# Patient Record
(demographics unavailable — no encounter records)

---

## 2025-02-27 NOTE — PHYSICAL EXAM
[Normal] : deep tendon reflexes were 2+ and symmetric [de-identified] : intermittently tearful  [de-identified] : TMs partially occluded by soft cerumen in canals bilaterally; oropharynx with asymmetry of the soft palate (s/p throat surgery) [de-identified] : +mild facial acne

## 2025-02-27 NOTE — ASSESSMENT
[FreeTextEntry1] : CHARLOTTE is a 17 y/o female presenting for initial evaluation of malnutrition in the setting of restrictive eating and exercise.  I believe CHARLOTTE has OSFED (other specified feeding or eating disorder), specifically atypical anorexia nervosa, as she demonstrates restriction of energy/food intake relative to requirements, fear of weight gain, and dysmorphic body image but is not at a significantly low body weight at this time.  She endorses strong eating disorder thoughts and has not received any eating disorder specific treatment despite struggling with her eating for the past 4-5 years.  Her current weight is 138.6 lbs and her treatment goal weight is at least 155-165 lbs based on review of her growth chart and previous weight curve.  She is medically stable at this time to begin outpatient treatment.    Plan - Psychoeducation on eating disorders and malnutrition provided to patient and parents today.  Discussed the physical effects and medical complications of malnutrition, as well as the adverse consequences of malnutrition on mental health, including worsening of anxiety and depression.  Discussed levels of care for eating disorder treatment, including outpatient care, [facility], PHP/day programs, residential programs, and inpatient treatment.  At this time, will plan to begin outpatient treatment, however pt may need HLOC if she is unable to make progress at home. - Nutrition recommendations provided by YOSHI today Tana today - please see separate note for details. - Discussed multidisciplinary team approach to treatment of eating disorders, including a medical provider, RD, and therapist +/- psychiatrist for medication management.   - Laboratory evaluation performed today (see below). - Will contact Ms. Roche, pt's guidance counselor at school, for collaboration of care. - RTC in 2 weeks for follow up with myself and RD.  All questions answered.  Support provided.

## 2025-02-27 NOTE — REVIEW OF SYSTEMS
[Normal] : Allergy /Immunologic [FreeTextEntry2] : +weight loss, feels tired, sleeps a lot  [FreeTextEntry3] : +wears glasses for myopia  [FreeTextEntry4] : no trouble swallowing, no other symptoms  [FreeTextEntry7] : +headaches and dizziness with weight loss  [FreeTextEntry8] : +occasional constipation with weight loss; no prior history of constipation  [de-identified] : +hair loss with weight loss [de-identified] : +cold intolerance with weight loss  [de-identified] : see HPI

## 2025-02-27 NOTE — REASON FOR VISIT
[Initial Evaluation] : an initial evaluation [Patient] : patient [Parents] : parents [Language Line ] : provided by Language Line   [Time Spent: ____ minutes] : Total time spent using  services: [unfilled] minutes. The patient's primary language is not English thus required  services. [FreeTextEntry1] : malnutrition/weight loss/eating disorder [Interpreters_IDNumber] : [id number] [Interpreters_FullName] : Rabia [TWNoteComboBox1] : Montenegrin

## 2025-02-27 NOTE — SOCIAL HISTORY
[Sexually active ever] : not sexually active [de-identified] : Lives in [place] with parents and younger sister (13 y/o), pet bird ("Teodoroiu").  Feels safe at home.  Gets along well with family. [FreeTextEntry9] : Table tennis club sport in school.  [de-identified] : 10th grader at [employer] in [place].  Grades are good.  Has friends in school.  No current bullying/safety concerns.  Wants to be an , , or  in the future.  Wants to go to college in [place].   [de-identified] : Denies. [de-identified] : Denies. [de-identified] : +Mood swings in the past few weeks.  Denies SI/SIB.  Stressed about school, her weight - speaking to her guidance counselor.  Possible trauma history at age 9 - see SW note - pt does not wish to discuss today, but has discussed with her guidance counselor. [de-identified] : Interested in boys.  Has a boyfriend.  No sexual activity.  Has been with boyfriend for over a year - parents aware.

## 2025-02-27 NOTE — HISTORY OF PRESENT ILLNESS
[Fear of Gaining Weight] : has fear of gaining weight [Preoccupation with Food, Shape and Weight] : preoccupation with food, shape and weight [Distorted  Body Image] : distorted body image [Purging ___] : no purging [Binging ___] : no binging [Diet Pills] : no diet pills [Emetics] : no emetics [Laxatives] : no laxatives [Diuretics] : no diuretics [Supplements] : no supplements [de-identified] : Tammie is a 17 y/o female presenting for evaluation of weight loss with concern for the presence of an eating disorder, referred by her school ([employer]).  Per parents, "we've noticed she doesn't want to eat because she is afraid of gaining weight".  Parents noticed a change in Javan about a year ago, but pt reports things began much earlier, when she started to overexercise during the COVID-19 pandemic in 2021.  Pt reports she was teased/bullied about her weight in middle school and began to exercise in the summer between 7th and 8th grade to try to lose weight/change her body appearance.  She began jogging on the treadmill for 2 hours 4 days per week; no other exercise at that time.  Also began to change her eating - tried to eat less, 1-2x/day.  Began counting calories and trying to stay under 1700 kcal per day.  Denies cutting out any specific foods from her diet.  Began weighing herself at home once every few weeks.  Majority of weight loss occurred between September 2022 - August 2023, when pt lost from approximately 150 lbs to 136 lbs according to her growth chart.  She has been maintaining at her current weight since September 2024 (beginning of the school year).  Parents report some days Javan eats normally and some days too little and feels guilty after eating foods she deems unhealthy.    Pt reports overeating/snacking when stressed and feels guilty afterwards but denies purging.  No binging or other ED behaviors.  Has been seeing her guidance counselor at school every 1-2 weeks for the past 3 months for body image concerns/dissatisfaction with her appearance.  Has never seen a therapist or nutritionist before.  PMD was never concerned about pt's weight loss, per parents. [de-identified] : approximately 150 lbs [de-identified] : September 2022 [de-identified] : 130 lbs [de-identified] : August 2024 [de-identified] : 138.6 lbs - no longer weighs herself at home [de-identified] : 120 lbs [de-identified] : B: nothing on weekdays; only eats breakfast on weekends (coffee, bread, scrambled eggs, cream cheese) L: nothing usually D: eats dinner sometimes but not yesterday (usually eats what mom cooks) S (9 pm): bowl of cereal (Cheerios) with whole milk  did not eat anything else yesterday - wasn't hungry and went on a school trip to [place] - normally tries to eat 2x/day (usually lunch and dinner) [de-identified] : see HPI - no current exercise  [de-identified] : age 12  [de-identified] : currently  [de-identified] : +regular menses

## 2025-03-12 NOTE — REASON FOR VISIT
[Follow-Up: _____] : a [unfilled] follow-up visit  [Patient] : patient [Parents] : parents [Language Line ] : provided by Language Line   [Time Spent: ____ minutes] : Total time spent using  services: [unfilled] minutes. The patient's primary language is not English thus required  services. [Interpreters_IDNumber] : [id number] [TWNoteComboBox1] : Danish

## 2025-03-12 NOTE — HISTORY OF PRESENT ILLNESS
[de-identified] : 15 y/o female presenting for malnutrition follow up in the setting of restrictive eating and exercise, diagnosed with atypical anorexia nervosa.  Seen for initial visit 2 weeks ago on 2/27.  Weight today is stable from visit 2 weeks ago at 138.6 lbs, though when asked, pt reports she thought she gained a lot of weight.  Per mom, pt has been eating "normally" since our last visit, 3x/day, and a bit more than before.  Not restricting as much.  Denies overeating/binging. For breakfast before school has been having smoothies made with fruit and low-fat cow's milk or cream cheese on whole wheat bread.  Has more food for breakfast on the weekends, per dad. For lunch - some days eats school cafeteria food (e.g. - chicken nuggets, pizza, etc.), or will eat at home after school around 3 pm - mom's home cooking.  Reports eating dinner consistently - home cooked food. Reports parents help her eat. Parents notice pt has more energy and she feels more confident about eating now, per mom. Not currently in therapy - was seeing guidance counselor in school (Ms. Roche) previously.  No acute physical complaints today. Initial labs notable for borderline low iron levels - has not started Slow Fe as pills are too big to swallow and would prefer a liquid iron formulation. [de-identified] : approximately 150 lbs [de-identified] : September 2022 [de-identified] : 130 lbs [de-identified] : August 2024 [de-identified] : 138.6 lbs [de-identified] : see RD note  [de-identified] : 2 weeks ago

## 2025-03-12 NOTE — ASSESSMENT
[FreeTextEntry1] : 17 y/o female presenting for malnutrition follow up in the setting of restrictive eating and exercise, diagnosed with atypical anorexia nervosa.  Seen for initial visit 2 weeks ago on 2/27.  Weight today is stable from visit 2 weeks ago at 138.6 lbs, though when asked, pt reports she thought she gained a lot of weight.  ED thoughts challenged.  Treatment goal weight is at least 155-165 lbs based on review of her growth chart and previous weight curve.  Has been eating better at home, per parents.  No acute concerns today.  Needs therapy referrals.  Plan - Appreciate RD recommendations (pt and parents meeting with YOSHI Fajardo today) for nutrition. - Provided with therapy resources in [place] today. - Iron deficiency: will provide liquid Fe as pt cannot swallow iron pills. - F/u in 3 weeks.

## 2025-03-12 NOTE — REVIEW OF SYSTEMS
[Normal] : ENT [FreeTextEntry3] : +wears glasses for myopia  [FreeTextEntry7] : +headaches and dizziness are stable [FreeTextEntry8] : constipation has improved, notes occasional mid-line lower abdominal pain but no dysuria or urinary symptoms; no gas/bloating [de-identified] : +hair loss with weight loss [de-identified] : +cold intolerance with weight loss  [de-identified] : see HPI

## 2025-03-12 NOTE — PHYSICAL EXAM
[Normal] : normal skin color and pigmentation, no significant rash and no skin lesions  [de-identified] : WWP, no LE edema [de-identified] : no suprapubic TTP, no CVAT [de-identified] : no gross deficits

## 2025-05-09 NOTE — HISTORY OF PRESENT ILLNESS
[de-identified] : 15 y/o female presenting for malnutrition follow up in the setting of restrictive eating and exercise c/w atypical anorexia nervosa.  Last seen 2 months ago on March 12.  Weight today is 134.6 lbs, down 4 lbs from last visit 2 months ago.  Pt happy about the weight loss.  Parents report pt is "eating better but not a lot".  Pt reports eating 2-3 meals per day.  Has consistent lunch and dinner meals but skips breakfast most days of the week as she is rushing to get to school.  Does eat breakfast on the weekends with family - coffee, eggs, pancakes, bread, etc - normal portions, per parents. For lunch and dinner, typically eats home cooked food prepared by mom.  Gets home and eats lunch around 4 pm after school and is not hungry for dinner with parents at 7-7:30 pm so sometimes just has cereal with milk, yogurt, or ice cream instead.  Has a lunch period in school at 11:10 am and does eat lunch sometimes, depending on if she likes the cafeteria food served that day - usually eats the school lunch 2x/week.     Started therapy with a new therapist - Blaire - in [place] 1 month ago.  Seeing her every 2 weeks in person - has had 2 sessions so far.  Feels comfortable with her.  Sessions are 1 hour long.  Pt also placed on wait list for [facility] [place] [facility] therapy. [de-identified] : 150 lbs [de-identified] : September 2022 [de-identified] : 130 lbs [de-identified] : August 2024 [de-identified] : 134.6 lbs [de-identified] : denies  [de-identified] : end of April [de-identified] : menses occurring monthly

## 2025-05-09 NOTE — REASON FOR VISIT
[Follow-Up: _____] : a [unfilled] follow-up visit  [Patient] : patient [Parents] : parents [Language Line ] : provided by Language Line   [Time Spent: ____ minutes] : Total time spent using  services: [unfilled] minutes. The patient's primary language is not English thus required  services. [Interpreters_IDNumber] : [id number] [TWNoteComboBox1] : Maldivian

## 2025-05-09 NOTE — PHYSICAL EXAM
[Normal] : abdomen normal bowel sounds, soft, non-tender, non distended and no hepatosplenomegaly [de-identified] : oropharynx with whitish exudates; baseline asymmetry of soft palate (s/p surgery); no petechiae [de-identified] : no cervical LAD [de-identified] : WWP, no LE edema [de-identified] : no gross deficits

## 2025-05-09 NOTE — REVIEW OF SYSTEMS
[Normal] : Allergy /Immunologic [de-identified] : see HPI [FreeTextEntry3] : +wears glasses for myopia  [FreeTextEntry4] : +sore throat x several days, subjective fever, +rhinorrhea/nasal congestion - saw PMD, prescribed amoxicillin x 10 days for presumed strep infection [FreeTextEntry7] : headaches and dizziness are resolved, pt is hydrating throughout the day  [FreeTextEntry8] : constipation has resolved, abdominal pain previously reported has improved  [de-identified] : +hair loss with weight loss [de-identified] : +cold intolerance unchanged [de-identified] : +borderline low iron levels, only took iron supplement for a few days as she didn't like the taste of the liquid

## 2025-05-09 NOTE — HISTORY OF PRESENT ILLNESS
[de-identified] : 15 y/o female presenting for malnutrition follow up in the setting of restrictive eating and exercise c/w atypical anorexia nervosa.  Last seen 2 months ago on March 12.  Weight today is 134.6 lbs, down 4 lbs from last visit 2 months ago.  Pt happy about the weight loss.  Parents report pt is "eating better but not a lot".  Pt reports eating 2-3 meals per day.  Has consistent lunch and dinner meals but skips breakfast most days of the week as she is rushing to get to school.  Does eat breakfast on the weekends with family - coffee, eggs, pancakes, bread, etc - normal portions, per parents. For lunch and dinner, typically eats home cooked food prepared by mom.  Gets home and eats lunch around 4 pm after school and is not hungry for dinner with parents at 7-7:30 pm so sometimes just has cereal with milk, yogurt, or ice cream instead.  Has a lunch period in school at 11:10 am and does eat lunch sometimes, depending on if she likes the cafeteria food served that day - usually eats the school lunch 2x/week.     Started therapy with a new therapist - Blaire - in [place] 1 month ago.  Seeing her every 2 weeks in person - has had 2 sessions so far.  Feels comfortable with her.  Sessions are 1 hour long.  Pt also placed on wait list for [facility] therapy. [de-identified] : 150 lbs [de-identified] : September 2022 [de-identified] : 130 lbs [de-identified] : August 2024 [de-identified] : 134.6 lbs [de-identified] : denies  [de-identified] : end of April [de-identified] : menses occurring monthly

## 2025-05-09 NOTE — PHYSICAL EXAM
[Normal] : abdomen normal bowel sounds, soft, non-tender, non distended and no hepatosplenomegaly [de-identified] : oropharynx with whitish exudates; baseline asymmetry of soft palate (s/p surgery); no petechiae [de-identified] : no cervical LAD [de-identified] : WWP, no LE edema [de-identified] : no gross deficits

## 2025-05-09 NOTE — REASON FOR VISIT
[Follow-Up: _____] : a [unfilled] follow-up visit  [Patient] : patient [Parents] : parents [Language Line ] : provided by Language Line   [Time Spent: ____ minutes] : Total time spent using  services: [unfilled] minutes. The patient's primary language is not English thus required  services. [Interpreters_IDNumber] : [id number] [TWNoteComboBox1] : Brazilian

## 2025-05-09 NOTE — ASSESSMENT
[FreeTextEntry1] : 17 y/o female presenting for malnutrition follow up in the setting of restrictive eating and exercise c/w atypical anorexia nervosa.  Last seen 2 months ago on March 12.  Weight today is 134.6 lbs, down 4 lbs from last visit 2 months ago.  Pt expresses happiness with the weight loss.  Treatment goal weight is at least 155-165 lbs based on review of pt's growth chart and previous weight curve.  Has been eating better overall since our initial visit, but could eat more, per parents, particularly at dinner.  Having a late lunch after school thus not hungry for dinner most days.  Denies current exercise.  Experiencing regular menstrual periods.  Started therapy recently, but therapist is not an ED specialist.  Remains on wait list for [facility] ED [facility].   Plan - Nutrition recommendations reviewed today - recommended eating lunch in school at 11:10 am (can bring something from home if she doesn't always like the cafeteria food), then snack after school at 4 pm, then dinner with parents at 7-7:30 pm. - C/w therapy.  On wait list for [facility] ED [facility] for eating disorder specific therapy. - Iron deficiency: repeat CBC and iron studies ordered today, pt not taking iron supplement consistently as she does not like the taste. - F/u in 1 month.

## 2025-05-09 NOTE — REVIEW OF SYSTEMS
[Normal] : Allergy /Immunologic [de-identified] : see HPI [FreeTextEntry3] : +wears glasses for myopia  [FreeTextEntry4] : +sore throat x several days, subjective fever, +rhinorrhea/nasal congestion - saw PMD, prescribed amoxicillin x 10 days for presumed strep infection [FreeTextEntry7] : headaches and dizziness are resolved, pt is hydrating throughout the day  [FreeTextEntry8] : constipation has resolved, abdominal pain previously reported has improved  [de-identified] : +hair loss with weight loss [de-identified] : +cold intolerance unchanged [de-identified] : +borderline low iron levels, only took iron supplement for a few days as she didn't like the taste of the liquid

## 2025-05-09 NOTE — ASSESSMENT
Session ID: [id number]  Language: Pashto   ID: #[id number]   Name: Orlando [FreeTextEntry1] : 15 y/o female presenting for malnutrition follow up in the setting of restrictive eating and exercise c/w atypical anorexia nervosa.  Last seen 2 months ago on March 12.  Weight today is 134.6 lbs, down 4 lbs from last visit 2 months ago.  Pt expresses happiness with the weight loss.  Treatment goal weight is at least 155-165 lbs based on review of pt's growth chart and previous weight curve.  Has been eating better overall since our initial visit, but could eat more, per parents, particularly at dinner.  Having a late lunch after school thus not hungry for dinner most days.  Denies current exercise.  Experiencing regular menstrual periods.  Started therapy recently, but therapist is not an ED specialist.  Remains on wait list for [facility] ED [facility].   Plan - Nutrition recommendations reviewed today - recommended eating lunch in school at 11:10 am (can bring something from home if she doesn't always like the cafeteria food), then snack after school at 4 pm, then dinner with parents at 7-7:30 pm. - C/w therapy.  On wait list for [facility] ED [facility] for eating disorder specific therapy. - Iron deficiency: repeat CBC and iron studies ordered today, pt not taking iron supplement consistently as she does not like the taste. - F/u in 1 month.

## 2025-07-30 NOTE — REASON FOR VISIT
[Follow-Up: _____] : a [unfilled] follow-up visit  [Patient] : patient [Parents] : parents [Language Line ] : provided by Language Line   [Time Spent: ____ minutes] : Total time spent using  services: [unfilled] minutes. The patient's primary language is not English thus required  services. [Interpreters_IDNumber] : [id number] [Interpreters_FullName] : Job [TWNoteComboBox1] : Turkmen see HPI

## 2025-07-30 NOTE — HISTORY OF PRESENT ILLNESS
[de-identified] : 17 y/o female presenting for malnutrition follow up in the setting of restrictive eating and exercise c/w atypical anorexia nervosa. Last seen on May 8.  Weight today is 130 lbs 8.18 oz, down 4 lbs from last visit.  Today, Javan reports doing well overall and having a relaxing summer.  Waking up between 12-1 pm and going to sleep around 12 am.  Mainly relaxing at home, but does go outside to shop and go to the park with her friend.  Has been painting at home, going on her phone, and playing video games.  Continues in weekly therapy with Blaire, which is going well.   Pt reports eating normally since summer began, however parents report pt needs to eat more, as she has a small portion for lunch and often refuses dinner. Breakfast is typically eaten around 12-1 pm soon after pt wakes up - has coffee and bread with CC, or a 1/2 bagel with CC and saves the rest for later if she is full.  Lunch is typically between 4-6 pm and pt eats whatever mom cooks - e.g. - meat and rice and sometimes vegetables - doesn't eat a full plate. Often skips dinner as lunch is late and she is not hungry, but parents will eat dinner at 8 pm.  Mom tries to get pt to eat but she only eats dinner sometimes and only if it is pizza or fast food.  Mom is also upset that pt doesn't want to eat any fruit. Does eat snacks - e.g. - bananas, Goldfish, sometimes donuts, crackers, cheese, etc. [de-identified] : 150 lbs [de-identified] : September 2022 [de-identified] : 130 lbs [de-identified] : August 2024 [de-identified] : 130 lbs 8.18 oz [de-identified] : see HPI [de-identified] : no longer exercising [de-identified] : +monthly menses, no heavy bleeding

## 2025-07-30 NOTE — HISTORY OF PRESENT ILLNESS
[de-identified] : 15 y/o female presenting for malnutrition follow up in the setting of restrictive eating and exercise c/w atypical anorexia nervosa. Last seen on May 8.  Weight today is 130 lbs 8.18 oz, down 4 lbs from last visit.  Today, Javan reports doing well overall and having a relaxing summer.  Waking up between 12-1 pm and going to sleep around 12 am.  Mainly relaxing at home, but does go outside to shop and go to the park with her friend.  Has been painting at home, going on her phone, and playing video games.  Continues in weekly therapy with Blaire, which is going well.   Pt reports eating normally since summer began, however parents report pt needs to eat more, as she has a small portion for lunch and often refuses dinner. Breakfast is typically eaten around 12-1 pm soon after pt wakes up - has coffee and bread with CC, or a 1/2 bagel with CC and saves the rest for later if she is full.  Lunch is typically between 4-6 pm and pt eats whatever mom cooks - e.g. - meat and rice and sometimes vegetables - doesn't eat a full plate. Often skips dinner as lunch is late and she is not hungry, but parents will eat dinner at 8 pm.  Mom tries to get pt to eat but she only eats dinner sometimes and only if it is pizza or fast food.  Mom is also upset that pt doesn't want to eat any fruit. Does eat snacks - e.g. - bananas, Goldfish, sometimes donuts, crackers, cheese, etc. [de-identified] : 150 lbs [de-identified] : September 2022 [de-identified] : 130 lbs [de-identified] : August 2024 [de-identified] : 130 lbs 8.18 oz [de-identified] : see HPI [de-identified] : no longer exercising [de-identified] : +monthly menses, no heavy bleeding

## 2025-07-30 NOTE — PHYSICAL EXAM
[Normal] : abdomen normal bowel sounds, soft, non-tender, non distended and no hepatosplenomegaly [de-identified] : WWP, no LE edema [de-identified] : no gross deficits

## 2025-07-30 NOTE — PHYSICAL EXAM
[Normal] : abdomen normal bowel sounds, soft, non-tender, non distended and no hepatosplenomegaly [de-identified] : WWP, no LE edema [de-identified] : no gross deficits

## 2025-07-30 NOTE — ASSESSMENT
[FreeTextEntry1] : 17 y/o female presenting for malnutrition follow up in the setting of restrictive eating and exercise c/w atypical anorexia nervosa.  Last seen on May 8.  Weight today is 130 lbs 8.18 oz, down 4 lbs from last visit.  Continues to lose weight slowly.  No longer exercising but still skipping meals and not eating enough overall.  Treatment goal weight is at least 155-165 lbs based on review of pt's growth chart and previous weight curve.  Experiencing regular menstrual periods with no acute physical complaints today.   Plan - Nutrition recommendations reviewed today - advised of need to eat 3 meals per day.  Discussed eating on a schedule: breakfast at 12 pm, lunch at 4 pm, and dinner at 8 pm with family.  Pt reports she likes the following fruit: watermelon, banana, strawberries, peach, christian, and pears.  Discussed having 1 serving of fruit at breakfast and 1 serving at lunch - pt in agreement with plan and nutrition goals. - C/w therapy. On [place] list [place] [facility] for eating disorder specific therapy. - Iron deficiency: c/w daily iron supplement, will repeat labs (CBC, iron studies) at next visit in 1 month. - F/u in 1 month.

## 2025-07-30 NOTE — REVIEW OF SYSTEMS
[Normal] : Allergy /Immunologic [FreeTextEntry3] : +wears glasses for myopia  [FreeTextEntry4] : URI symptoms previously reported have resolved  [FreeTextEntry7] : headaches and dizziness are resolved, pt is hydrating throughout the day  [FreeTextEntry8] : constipation has resolved, abdominal pain previously reported has improved  [de-identified] : +hair loss with weight loss [de-identified] : +cold intolerance resolved  [de-identified] : +low ferritin level - taking iron supplement daily now  [de-identified] : in therapy

## 2025-07-30 NOTE — REVIEW OF SYSTEMS
[Normal] : Allergy /Immunologic [FreeTextEntry3] : +wears glasses for myopia  [FreeTextEntry4] : URI symptoms previously reported have resolved  [FreeTextEntry7] : headaches and dizziness are resolved, pt is hydrating throughout the day  [FreeTextEntry8] : constipation has resolved, abdominal pain previously reported has improved  [de-identified] : +hair loss with weight loss [de-identified] : +cold intolerance resolved  [de-identified] : +low ferritin level - taking iron supplement daily now  [de-identified] : in therapy

## 2025-07-30 NOTE — REASON FOR VISIT
[Follow-Up: _____] : a [unfilled] follow-up visit  [Patient] : patient [Parents] : parents [Language Line ] : provided by [employer]   [Time Spent: ____ minutes] : Total time spent using  services: [unfilled] minutes. The patient's primary language is not English thus required  services. [Interpreters_IDNumber] : [id number] [Interpreters_FullName] : Job [TWNoteComboBox1] : [place]

## 2025-07-30 NOTE — ASSESSMENT
[FreeTextEntry1] : 15 y/o female presenting for malnutrition follow up in the setting of restrictive eating and exercise c/w atypical anorexia nervosa.  Last seen on May 8.  Weight today is 130 lbs 8.18 oz, down 4 lbs from last visit.  Continues to lose weight slowly.  No longer exercising but still skipping meals and not eating enough overall.  Treatment goal weight is at least 155-165 lbs based on review of pt's growth chart and previous weight curve.  Experiencing regular menstrual periods with no acute physical complaints today.   Plan - Nutrition recommendations reviewed today - advised of need to eat 3 meals per day.  Discussed eating on a schedule: breakfast at 12 pm, lunch at 4 pm, and dinner at 8 pm with family.  Pt reports she likes the following fruit: watermelon, banana, strawberries, peach, christian, and pears.  Discussed having 1 serving of fruit at breakfast and 1 serving at lunch - pt in agreement with plan and nutrition goals. - C/w therapy. On wait list for [facility] for eating disorder specific therapy. - Iron deficiency: c/w daily iron supplement, will repeat labs (CBC, iron studies) at next visit in 1 month. - F/u in 1 month.